# Patient Record
(demographics unavailable — no encounter records)

---

## 2025-06-12 NOTE — HISTORY OF PRESENT ILLNESS
[Postpartum Follow Up] : postpartum follow up [Delivery Date: ___] : on [unfilled] [] : delivered by vaginal delivery [Clean/Dry/Intact] : clean, dry and intact [Back to Normal] : is back to normal in size [None] : no vaginal bleeding [Normal] : the vagina was normal [Doing Well] : is doing well [No Sign of Infection] : is showing no signs of infection [FreeTextEntry8] : Pt here today because she is feeling vaginal irritation and wants information regarding birth control [de-identified] : Pt reassured, rv for post partum visit. birth control information given to patient